# Patient Record
Sex: FEMALE | Race: WHITE | NOT HISPANIC OR LATINO | URBAN - METROPOLITAN AREA
[De-identification: names, ages, dates, MRNs, and addresses within clinical notes are randomized per-mention and may not be internally consistent; named-entity substitution may affect disease eponyms.]

---

## 2019-12-10 ENCOUNTER — OFFICE VISIT (OUTPATIENT)
Dept: PULMONOLOGY | Age: 74
End: 2019-12-10

## 2019-12-10 VITALS
HEART RATE: 90 BPM | WEIGHT: 214.6 LBS | SYSTOLIC BLOOD PRESSURE: 110 MMHG | HEIGHT: 66 IN | BODY MASS INDEX: 34.49 KG/M2 | OXYGEN SATURATION: 95 % | TEMPERATURE: 100.1 F | DIASTOLIC BLOOD PRESSURE: 54 MMHG | RESPIRATION RATE: 18 BRPM

## 2019-12-10 DIAGNOSIS — R05.9 COUGH: Primary | ICD-10-CM

## 2019-12-10 DIAGNOSIS — G47.33 OSA (OBSTRUCTIVE SLEEP APNEA): ICD-10-CM

## 2019-12-10 DIAGNOSIS — Z87.891 HISTORY OF TOBACCO USE: ICD-10-CM

## 2019-12-10 PROCEDURE — 99204 OFFICE O/P NEW MOD 45 MIN: CPT | Performed by: INTERNAL MEDICINE

## 2019-12-10 RX ORDER — ALBUTEROL SULFATE 2.5 MG/3ML
2.5 SOLUTION RESPIRATORY (INHALATION) PRN
COMMUNITY
Start: 2019-10-28

## 2019-12-10 RX ORDER — ALBUTEROL SULFATE 90 UG/1
2 AEROSOL, METERED RESPIRATORY (INHALATION) PRN
Refills: 0 | COMMUNITY
Start: 2019-10-28 | End: 2020-06-19 | Stop reason: SDUPTHER

## 2019-12-10 RX ORDER — NITROGLYCERIN 0.3 MG/1
0.3 TABLET SUBLINGUAL PRN
COMMUNITY
Start: 2015-08-18 | End: 2023-06-01 | Stop reason: ALTCHOICE

## 2019-12-10 RX ORDER — ALBUTEROL SULFATE 2.5 MG/3ML
2.5 SOLUTION RESPIRATORY (INHALATION) PRN
Refills: 0 | COMMUNITY
Start: 2019-10-28 | End: 2020-06-19 | Stop reason: SDUPTHER

## 2019-12-10 RX ORDER — BUDESONIDE AND FORMOTEROL FUMARATE DIHYDRATE 160; 4.5 UG/1; UG/1
2 AEROSOL RESPIRATORY (INHALATION) PRN
COMMUNITY
Start: 2019-11-08

## 2019-12-10 RX ORDER — LOSARTAN POTASSIUM 100 MG/1
50 TABLET ORAL DAILY
COMMUNITY
Start: 2018-09-04

## 2019-12-10 RX ORDER — ALBUTEROL SULFATE 90 UG/1
2 AEROSOL, METERED RESPIRATORY (INHALATION) PRN
COMMUNITY
Start: 2019-10-28

## 2019-12-10 RX ORDER — ROSUVASTATIN CALCIUM 40 MG/1
20 TABLET, COATED ORAL EVERY EVENING
COMMUNITY
Start: 2013-07-18

## 2019-12-10 SDOH — HEALTH STABILITY: MENTAL HEALTH: HOW OFTEN DO YOU HAVE A DRINK CONTAINING ALCOHOL?: NEVER

## 2019-12-18 ENCOUNTER — TELEPHONE (OUTPATIENT)
Dept: CT IMAGING | Age: 74
End: 2019-12-18

## 2019-12-27 ENCOUNTER — OFFICE VISIT (OUTPATIENT)
Dept: PULMONOLOGY | Age: 74
End: 2019-12-27
Attending: INTERNAL MEDICINE

## 2019-12-27 VITALS — HEIGHT: 66 IN | BODY MASS INDEX: 34.87 KG/M2 | WEIGHT: 217 LBS

## 2019-12-27 DIAGNOSIS — R05.9 COUGH: Primary | ICD-10-CM

## 2019-12-27 PROCEDURE — 94729 DIFFUSING CAPACITY: CPT | Performed by: INTERNAL MEDICINE

## 2019-12-27 PROCEDURE — 94010 BREATHING CAPACITY TEST: CPT | Performed by: INTERNAL MEDICINE

## 2019-12-27 PROCEDURE — 94727 GAS DIL/WSHOT DETER LNG VOL: CPT | Performed by: INTERNAL MEDICINE

## 2020-01-08 ENCOUNTER — TELEPHONE (OUTPATIENT)
Dept: PULMONOLOGY | Age: 75
End: 2020-01-08

## 2020-01-09 ENCOUNTER — TELEPHONE (OUTPATIENT)
Dept: PULMONOLOGY | Age: 75
End: 2020-01-09

## 2020-01-23 ENCOUNTER — HOSPITAL ENCOUNTER (OUTPATIENT)
Dept: HOSPITAL 87 - OR | Age: 75
Discharge: HOME | End: 2020-01-23
Attending: ORTHOPAEDIC SURGERY
Payer: MEDICARE

## 2020-01-23 VITALS — WEIGHT: 209 LBS | BODY MASS INDEX: 38.46 KG/M2 | HEIGHT: 62 IN

## 2020-01-23 DIAGNOSIS — E11.9: ICD-10-CM

## 2020-01-23 DIAGNOSIS — Z88.2: ICD-10-CM

## 2020-01-23 DIAGNOSIS — J44.9: ICD-10-CM

## 2020-01-23 DIAGNOSIS — Z90.710: ICD-10-CM

## 2020-01-23 DIAGNOSIS — E78.00: ICD-10-CM

## 2020-01-23 DIAGNOSIS — E66.01: ICD-10-CM

## 2020-01-23 DIAGNOSIS — K21.9: ICD-10-CM

## 2020-01-23 DIAGNOSIS — Z88.0: ICD-10-CM

## 2020-01-23 DIAGNOSIS — Z88.1: ICD-10-CM

## 2020-01-23 DIAGNOSIS — M10.9: ICD-10-CM

## 2020-01-23 DIAGNOSIS — Z88.8: ICD-10-CM

## 2020-01-23 DIAGNOSIS — F32.9: ICD-10-CM

## 2020-01-23 DIAGNOSIS — Z79.899: ICD-10-CM

## 2020-01-23 DIAGNOSIS — M17.9: ICD-10-CM

## 2020-01-23 DIAGNOSIS — Z82.49: ICD-10-CM

## 2020-01-23 DIAGNOSIS — Z98.890: ICD-10-CM

## 2020-01-23 DIAGNOSIS — I10: ICD-10-CM

## 2020-01-23 DIAGNOSIS — Z79.82: ICD-10-CM

## 2020-01-23 DIAGNOSIS — M65.4: Primary | ICD-10-CM

## 2020-01-23 DIAGNOSIS — F41.9: ICD-10-CM

## 2020-01-23 PROCEDURE — 25000 INCISION OF TENDON SHEATH: CPT

## 2020-01-23 PROCEDURE — 82962 GLUCOSE BLOOD TEST: CPT

## 2020-03-17 ENCOUNTER — APPOINTMENT (OUTPATIENT)
Dept: PULMONOLOGY | Age: 75
End: 2020-03-17

## 2020-06-19 RX ORDER — MELOXICAM 15 MG/1
15 TABLET ORAL DAILY
COMMUNITY
End: 2023-06-01 | Stop reason: ALTCHOICE

## 2020-06-19 RX ORDER — GABAPENTIN 300 MG/1
300 CAPSULE ORAL 2 TIMES DAILY
Status: ON HOLD | COMMUNITY
End: 2023-06-02 | Stop reason: SDUPTHER

## 2020-06-30 ENCOUNTER — TELEPHONIC VISIT (OUTPATIENT)
Dept: PULMONOLOGY | Age: 75
End: 2020-06-30

## 2020-06-30 ENCOUNTER — OFF PREMISE (OUTPATIENT)
Dept: CARDIOLOGY | Age: 75
End: 2020-06-30

## 2020-08-10 ENCOUNTER — APPOINTMENT (OUTPATIENT)
Dept: PULMONOLOGY | Age: 75
End: 2020-08-10

## 2021-06-03 ENCOUNTER — HOSPITAL ENCOUNTER (INPATIENT)
Dept: HOSPITAL 87 - ER | Age: 76
LOS: 5 days | Discharge: HOME | DRG: 439 | End: 2021-06-08
Attending: INTERNAL MEDICINE | Admitting: INTERNAL MEDICINE
Payer: MEDICARE

## 2021-06-03 VITALS — HEIGHT: 62 IN | WEIGHT: 210 LBS | BODY MASS INDEX: 38.64 KG/M2

## 2021-06-03 DIAGNOSIS — E11.22: ICD-10-CM

## 2021-06-03 DIAGNOSIS — I12.9: ICD-10-CM

## 2021-06-03 DIAGNOSIS — Z88.2: ICD-10-CM

## 2021-06-03 DIAGNOSIS — N32.81: ICD-10-CM

## 2021-06-03 DIAGNOSIS — E11.42: ICD-10-CM

## 2021-06-03 DIAGNOSIS — Z79.899: ICD-10-CM

## 2021-06-03 DIAGNOSIS — Z86.010: ICD-10-CM

## 2021-06-03 DIAGNOSIS — E11.51: ICD-10-CM

## 2021-06-03 DIAGNOSIS — M19.019: ICD-10-CM

## 2021-06-03 DIAGNOSIS — Z82.49: ICD-10-CM

## 2021-06-03 DIAGNOSIS — Z79.51: ICD-10-CM

## 2021-06-03 DIAGNOSIS — Z87.19: ICD-10-CM

## 2021-06-03 DIAGNOSIS — R16.0: ICD-10-CM

## 2021-06-03 DIAGNOSIS — G47.33: ICD-10-CM

## 2021-06-03 DIAGNOSIS — K76.0: ICD-10-CM

## 2021-06-03 DIAGNOSIS — M96.1: ICD-10-CM

## 2021-06-03 DIAGNOSIS — Z83.3: ICD-10-CM

## 2021-06-03 DIAGNOSIS — F41.9: ICD-10-CM

## 2021-06-03 DIAGNOSIS — K85.10: Primary | ICD-10-CM

## 2021-06-03 DIAGNOSIS — Z88.8: ICD-10-CM

## 2021-06-03 DIAGNOSIS — Z88.0: ICD-10-CM

## 2021-06-03 DIAGNOSIS — K80.00: ICD-10-CM

## 2021-06-03 DIAGNOSIS — E66.01: ICD-10-CM

## 2021-06-03 DIAGNOSIS — J44.9: ICD-10-CM

## 2021-06-03 DIAGNOSIS — N18.9: ICD-10-CM

## 2021-06-03 DIAGNOSIS — N39.0: ICD-10-CM

## 2021-06-03 DIAGNOSIS — M17.10: ICD-10-CM

## 2021-06-03 LAB
BASOPHILS NFR BLD AUTO: 0.5 % (ref 0–2)
CHLORIDE SERPL-SCNC: 105 MEQ/L (ref 98–107)
EOSINOPHIL NFR BLD AUTO: 0.3 % (ref 0–5)
ERYTHROCYTE [DISTWIDTH] IN BLOOD BY AUTOMATED COUNT: 14.7 % (ref 11.6–14.6)
ETHANOL SERPL-MCNC: < 10 MG/DL
HCT VFR BLD AUTO: 38.7 % (ref 36–48)
HGB BLD-MCNC: 12.4 G/DL (ref 12–16)
INR PPP: 1
LYMPHOCYTES NFR BLD AUTO: 16.1 % (ref 20–50)
MCH RBC QN AUTO: 27.4 PG (ref 28–32)
MCV RBC AUTO: 85.5 FL (ref 81–99)
MONOCYTES NFR BLD AUTO: 6.3 % (ref 2–8)
NEUTROPHILS NFR BLD AUTO: 76.8 % (ref 40–76)
PLATELET # BLD AUTO: 219 X1000/UL (ref 130–400)
PMV BLD AUTO: 9.2 FL (ref 7.4–10.4)
PROTHROMBIN TIME: 11.2 SEC (ref 9.6–11)
RBC # BLD AUTO: 4.53 MILL/UL (ref 4.2–5.4)

## 2021-06-03 PROCEDURE — 74176 CT ABD & PELVIS W/O CONTRAST: CPT

## 2021-06-03 PROCEDURE — 80320 DRUG SCREEN QUANTALCOHOLS: CPT

## 2021-06-03 PROCEDURE — 82150 ASSAY OF AMYLASE: CPT

## 2021-06-03 PROCEDURE — 83605 ASSAY OF LACTIC ACID: CPT

## 2021-06-03 PROCEDURE — 36415 COLL VENOUS BLD VENIPUNCTURE: CPT

## 2021-06-03 PROCEDURE — G0480 DRUG TEST DEF 1-7 CLASSES: HCPCS

## 2021-06-03 PROCEDURE — 80061 LIPID PANEL: CPT

## 2021-06-03 PROCEDURE — 80305 DRUG TEST PRSMV DIR OPT OBS: CPT

## 2021-06-03 PROCEDURE — 81003 URINALYSIS AUTO W/O SCOPE: CPT

## 2021-06-03 PROCEDURE — 80048 BASIC METABOLIC PNL TOTAL CA: CPT

## 2021-06-03 PROCEDURE — 93970 EXTREMITY STUDY: CPT

## 2021-06-03 PROCEDURE — 76700 US EXAM ABDOM COMPLETE: CPT

## 2021-06-03 PROCEDURE — 99285 EMERGENCY DEPT VISIT HI MDM: CPT

## 2021-06-03 PROCEDURE — 82962 GLUCOSE BLOOD TEST: CPT

## 2021-06-03 PROCEDURE — 80053 COMPREHEN METABOLIC PANEL: CPT

## 2021-06-03 PROCEDURE — 83036 HEMOGLOBIN GLYCOSYLATED A1C: CPT

## 2021-06-03 PROCEDURE — 85025 COMPLETE CBC W/AUTO DIFF WBC: CPT

## 2021-06-04 VITALS — SYSTOLIC BLOOD PRESSURE: 141 MMHG | DIASTOLIC BLOOD PRESSURE: 62 MMHG

## 2021-06-04 VITALS — SYSTOLIC BLOOD PRESSURE: 146 MMHG | DIASTOLIC BLOOD PRESSURE: 65 MMHG

## 2021-06-04 VITALS — SYSTOLIC BLOOD PRESSURE: 151 MMHG | DIASTOLIC BLOOD PRESSURE: 58 MMHG

## 2021-06-04 VITALS — SYSTOLIC BLOOD PRESSURE: 124 MMHG | DIASTOLIC BLOOD PRESSURE: 48 MMHG

## 2021-06-04 VITALS — SYSTOLIC BLOOD PRESSURE: 144 MMHG | DIASTOLIC BLOOD PRESSURE: 56 MMHG

## 2021-06-04 LAB
AMPHETAMINES UR QL SCN: NEGATIVE
AMYLASE SERPL-CCNC: 490 IU/L (ref 25–115)
APPEARANCE UR: (no result)
BARBITURATES UR QL SCN: NEGATIVE
BENZODIAZ UR QL SCN: NEGATIVE
BZE UR QL SCN: NEGATIVE
CANNABINOIDS UR QL SCN: NEGATIVE
COLOR UR: YELLOW
HGB UR QL STRIP: (no result)
KETONES UR STRIP-MCNC: (no result) MG/DL
LEUKOCYTE ESTERASE UR QL STRIP: (no result)
METHADONE UR QL SCN: NEGATIVE
NITRITE UR QL STRIP: NEGATIVE
OPIATES UR QL SCN: (no result)
PCP UR QL SCN: NEGATIVE
PH UR STRIP: 5.5 [PH] (ref 4.5–8)
PROT UR QL STRIP: (no result)
SP GR UR STRIP: 1.03 (ref 1–1.03)
UROBILINOGEN UR STRIP-MCNC: 0.2 E.U./DL (ref 0.2–1)

## 2021-06-04 RX ADMIN — Medication SCH STRIP: at 18:16

## 2021-06-04 RX ADMIN — FOLIC ACID SCH MLS/HR: 5 INJECTION, SOLUTION INTRAMUSCULAR; INTRAVENOUS; SUBCUTANEOUS at 10:42

## 2021-06-04 RX ADMIN — Medication SCH STRIP: at 12:20

## 2021-06-04 RX ADMIN — LEVOFLOXACIN SCH MLS/HR: 5 INJECTION, SOLUTION INTRAVENOUS at 12:48

## 2021-06-04 RX ADMIN — MORPHINE SULFATE PRN MG: 2 INJECTION, SOLUTION INTRAMUSCULAR; INTRAVENOUS at 10:40

## 2021-06-04 RX ADMIN — Medication SCH STRIP: at 20:28

## 2021-06-04 RX ADMIN — HYDRALAZINE HYDROCHLORIDE SCH MG: 50 TABLET ORAL at 20:37

## 2021-06-04 RX ADMIN — FOLIC ACID SCH MLS/HR: 5 INJECTION, SOLUTION INTRAMUSCULAR; INTRAVENOUS; SUBCUTANEOUS at 20:38

## 2021-06-04 RX ADMIN — HYDRALAZINE HYDROCHLORIDE SCH MG: 50 TABLET ORAL at 10:38

## 2021-06-04 RX ADMIN — MORPHINE SULFATE PRN MG: 2 INJECTION, SOLUTION INTRAMUSCULAR; INTRAVENOUS at 18:24

## 2021-06-05 VITALS — SYSTOLIC BLOOD PRESSURE: 145 MMHG | DIASTOLIC BLOOD PRESSURE: 60 MMHG

## 2021-06-05 VITALS — DIASTOLIC BLOOD PRESSURE: 72 MMHG | SYSTOLIC BLOOD PRESSURE: 123 MMHG

## 2021-06-05 VITALS — SYSTOLIC BLOOD PRESSURE: 165 MMHG | DIASTOLIC BLOOD PRESSURE: 59 MMHG

## 2021-06-05 VITALS — SYSTOLIC BLOOD PRESSURE: 143 MMHG | DIASTOLIC BLOOD PRESSURE: 66 MMHG

## 2021-06-05 LAB
AMYLASE SERPL-CCNC: 155 IU/L (ref 25–115)
BASOPHILS NFR BLD AUTO: 0.4 % (ref 0–2)
CHLORIDE SERPL-SCNC: 107 MEQ/L (ref 98–107)
EOSINOPHIL NFR BLD AUTO: 1.5 % (ref 0–5)
ERYTHROCYTE [DISTWIDTH] IN BLOOD BY AUTOMATED COUNT: 14.5 % (ref 11.6–14.6)
HCT VFR BLD AUTO: 32.2 % (ref 36–48)
HDLC SERPL-MCNC: 38 MG/DL (ref 40–59)
HGB BLD-MCNC: 10.4 G/DL (ref 12–16)
LDLC SERPL DIRECT ASSAY-MCNC: 62 MG/DL (ref 5–100)
LYMPHOCYTES NFR BLD AUTO: 12 % (ref 20–50)
MCH RBC QN AUTO: 27.6 PG (ref 28–32)
MCV RBC AUTO: 85.3 FL (ref 81–99)
MONOCYTES NFR BLD AUTO: 8.3 % (ref 2–8)
NEUTROPHILS NFR BLD AUTO: 77.8 % (ref 40–76)
PLATELET # BLD AUTO: 178 X1000/UL (ref 130–400)
PMV BLD AUTO: 9.1 FL (ref 7.4–10.4)
RBC # BLD AUTO: 3.78 MILL/UL (ref 4.2–5.4)

## 2021-06-05 RX ADMIN — FOLIC ACID SCH MLS/HR: 5 INJECTION, SOLUTION INTRAMUSCULAR; INTRAVENOUS; SUBCUTANEOUS at 06:24

## 2021-06-05 RX ADMIN — Medication SCH STRIP: at 17:09

## 2021-06-05 RX ADMIN — FOLIC ACID SCH MLS/HR: 5 INJECTION, SOLUTION INTRAMUSCULAR; INTRAVENOUS; SUBCUTANEOUS at 16:41

## 2021-06-05 RX ADMIN — MORPHINE SULFATE PRN MG: 2 INJECTION, SOLUTION INTRAMUSCULAR; INTRAVENOUS at 18:02

## 2021-06-05 RX ADMIN — Medication SCH STRIP: at 06:22

## 2021-06-05 RX ADMIN — ACETAMINOPHEN PRN MG: 325 TABLET, FILM COATED ORAL at 08:38

## 2021-06-05 RX ADMIN — HYDRALAZINE HYDROCHLORIDE SCH MG: 50 TABLET ORAL at 20:24

## 2021-06-05 RX ADMIN — HYDRALAZINE HYDROCHLORIDE SCH MG: 50 TABLET ORAL at 08:37

## 2021-06-05 RX ADMIN — Medication SCH STRIP: at 20:30

## 2021-06-05 RX ADMIN — LEVOFLOXACIN SCH MLS/HR: 5 INJECTION, SOLUTION INTRAVENOUS at 11:03

## 2021-06-05 RX ADMIN — Medication SCH STRIP: at 12:04

## 2021-06-05 RX ADMIN — FOLIC ACID SCH MLS/HR: 5 INJECTION, SOLUTION INTRAMUSCULAR; INTRAVENOUS; SUBCUTANEOUS at 20:25

## 2021-06-05 RX ADMIN — MORPHINE SULFATE PRN MG: 2 INJECTION, SOLUTION INTRAMUSCULAR; INTRAVENOUS at 11:09

## 2021-06-06 VITALS — DIASTOLIC BLOOD PRESSURE: 71 MMHG | SYSTOLIC BLOOD PRESSURE: 191 MMHG

## 2021-06-06 VITALS — DIASTOLIC BLOOD PRESSURE: 89 MMHG | SYSTOLIC BLOOD PRESSURE: 191 MMHG

## 2021-06-06 VITALS — SYSTOLIC BLOOD PRESSURE: 153 MMHG | DIASTOLIC BLOOD PRESSURE: 70 MMHG

## 2021-06-06 LAB
BASOPHILS NFR BLD AUTO: 0.3 % (ref 0–2)
CHLORIDE SERPL-SCNC: 109 MEQ/L (ref 98–107)
EOSINOPHIL NFR BLD AUTO: 2.1 % (ref 0–5)
ERYTHROCYTE [DISTWIDTH] IN BLOOD BY AUTOMATED COUNT: 14.1 % (ref 11.6–14.6)
HCT VFR BLD AUTO: 34.7 % (ref 36–48)
HGB BLD-MCNC: 11.5 G/DL (ref 12–16)
LYMPHOCYTES NFR BLD AUTO: 14.5 % (ref 20–50)
MCH RBC QN AUTO: 28 PG (ref 28–32)
MCV RBC AUTO: 84.3 FL (ref 81–99)
MONOCYTES NFR BLD AUTO: 6.8 % (ref 2–8)
NEUTROPHILS NFR BLD AUTO: 76.3 % (ref 40–76)
PLATELET # BLD AUTO: 254 X1000/UL (ref 130–400)
PMV BLD AUTO: 9 FL (ref 7.4–10.4)
RBC # BLD AUTO: 4.11 MILL/UL (ref 4.2–5.4)

## 2021-06-06 RX ADMIN — MORPHINE SULFATE PRN MG: 2 INJECTION, SOLUTION INTRAMUSCULAR; INTRAVENOUS at 15:30

## 2021-06-06 RX ADMIN — HYDRALAZINE HYDROCHLORIDE SCH MG: 100 TABLET, FILM COATED ORAL at 20:31

## 2021-06-06 RX ADMIN — Medication SCH STRIP: at 12:20

## 2021-06-06 RX ADMIN — LISINOPRIL SCH MG: 20 TABLET ORAL at 20:47

## 2021-06-06 RX ADMIN — Medication SCH STRIP: at 06:04

## 2021-06-06 RX ADMIN — ACETAMINOPHEN PRN MG: 325 TABLET, FILM COATED ORAL at 11:13

## 2021-06-06 RX ADMIN — LEVOFLOXACIN SCH MLS/HR: 5 INJECTION, SOLUTION INTRAVENOUS at 11:07

## 2021-06-06 RX ADMIN — HYDRALAZINE HYDROCHLORIDE SCH MG: 50 TABLET ORAL at 09:48

## 2021-06-06 RX ADMIN — FOLIC ACID SCH MLS/HR: 5 INJECTION, SOLUTION INTRAMUSCULAR; INTRAVENOUS; SUBCUTANEOUS at 15:28

## 2021-06-06 RX ADMIN — Medication SCH STRIP: at 20:31

## 2021-06-06 RX ADMIN — POLYETHYLENE GLYCOL 3350 SCH EA: 17 POWDER, FOR SOLUTION ORAL at 14:15

## 2021-06-06 RX ADMIN — Medication SCH STRIP: at 18:13

## 2021-06-06 RX ADMIN — ACETAMINOPHEN PRN MG: 325 TABLET, FILM COATED ORAL at 00:39

## 2021-06-06 RX ADMIN — SENNOSIDES AND DOCUSATE SODIUM SCH TAB: 8.6; 5 TABLET ORAL at 21:00

## 2021-06-06 RX ADMIN — DOCUSATE SODIUM SCH MG: 100 CAPSULE, LIQUID FILLED ORAL at 17:00

## 2021-06-07 VITALS — DIASTOLIC BLOOD PRESSURE: 75 MMHG | SYSTOLIC BLOOD PRESSURE: 180 MMHG

## 2021-06-07 VITALS — SYSTOLIC BLOOD PRESSURE: 161 MMHG | DIASTOLIC BLOOD PRESSURE: 73 MMHG

## 2021-06-07 VITALS — SYSTOLIC BLOOD PRESSURE: 193 MMHG | DIASTOLIC BLOOD PRESSURE: 81 MMHG

## 2021-06-07 VITALS — DIASTOLIC BLOOD PRESSURE: 67 MMHG | SYSTOLIC BLOOD PRESSURE: 173 MMHG

## 2021-06-07 VITALS — DIASTOLIC BLOOD PRESSURE: 75 MMHG | SYSTOLIC BLOOD PRESSURE: 179 MMHG

## 2021-06-07 VITALS — SYSTOLIC BLOOD PRESSURE: 182 MMHG | DIASTOLIC BLOOD PRESSURE: 94 MMHG

## 2021-06-07 VITALS — SYSTOLIC BLOOD PRESSURE: 189 MMHG | DIASTOLIC BLOOD PRESSURE: 76 MMHG

## 2021-06-07 LAB
AMYLASE SERPL-CCNC: 64 IU/L (ref 25–115)
BASOPHILS NFR BLD AUTO: 0.4 % (ref 0–2)
CHLORIDE SERPL-SCNC: 108 MEQ/L (ref 98–107)
EOSINOPHIL NFR BLD AUTO: 2.3 % (ref 0–5)
ERYTHROCYTE [DISTWIDTH] IN BLOOD BY AUTOMATED COUNT: 14.2 % (ref 11.6–14.6)
HCT VFR BLD AUTO: 33 % (ref 36–48)
HGB BLD-MCNC: 10.9 G/DL (ref 12–16)
LYMPHOCYTES NFR BLD AUTO: 13.3 % (ref 20–50)
MCH RBC QN AUTO: 27.9 PG (ref 28–32)
MCV RBC AUTO: 84.7 FL (ref 81–99)
MONOCYTES NFR BLD AUTO: 7.8 % (ref 2–8)
NEUTROPHILS NFR BLD AUTO: 76.2 % (ref 40–76)
PLATELET # BLD AUTO: 251 X1000/UL (ref 130–400)
PMV BLD AUTO: 9 FL (ref 7.4–10.4)
RBC # BLD AUTO: 3.9 MILL/UL (ref 4.2–5.4)

## 2021-06-07 RX ADMIN — HYDRALAZINE HYDROCHLORIDE SCH MG: 100 TABLET, FILM COATED ORAL at 15:11

## 2021-06-07 RX ADMIN — POLYETHYLENE GLYCOL 3350 SCH EA: 17 POWDER, FOR SOLUTION ORAL at 08:55

## 2021-06-07 RX ADMIN — SENNOSIDES AND DOCUSATE SODIUM SCH TAB: 8.6; 5 TABLET ORAL at 21:00

## 2021-06-07 RX ADMIN — HYDRALAZINE HYDROCHLORIDE SCH MG: 100 TABLET, FILM COATED ORAL at 05:28

## 2021-06-07 RX ADMIN — LEVOFLOXACIN SCH MLS/HR: 5 INJECTION, SOLUTION INTRAVENOUS at 10:16

## 2021-06-07 RX ADMIN — Medication SCH STRIP: at 17:23

## 2021-06-07 RX ADMIN — LISINOPRIL SCH MG: 20 TABLET ORAL at 21:45

## 2021-06-07 RX ADMIN — DOCUSATE SODIUM SCH MG: 100 CAPSULE, LIQUID FILLED ORAL at 08:55

## 2021-06-07 RX ADMIN — HYDRALAZINE HYDROCHLORIDE SCH MG: 100 TABLET, FILM COATED ORAL at 21:44

## 2021-06-07 RX ADMIN — MORPHINE SULFATE PRN MG: 2 INJECTION, SOLUTION INTRAMUSCULAR; INTRAVENOUS at 04:44

## 2021-06-07 RX ADMIN — ACETAMINOPHEN PRN MG: 325 TABLET, FILM COATED ORAL at 12:01

## 2021-06-07 RX ADMIN — LISINOPRIL SCH MG: 20 TABLET ORAL at 08:55

## 2021-06-07 RX ADMIN — FOLIC ACID SCH MLS/HR: 5 INJECTION, SOLUTION INTRAMUSCULAR; INTRAVENOUS; SUBCUTANEOUS at 08:56

## 2021-06-07 RX ADMIN — FOLIC ACID SCH MLS/HR: 5 INJECTION, SOLUTION INTRAMUSCULAR; INTRAVENOUS; SUBCUTANEOUS at 19:08

## 2021-06-07 RX ADMIN — Medication SCH STRIP: at 06:47

## 2021-06-07 RX ADMIN — Medication SCH STRIP: at 11:52

## 2021-06-07 RX ADMIN — Medication SCH STRIP: at 21:45

## 2021-06-07 RX ADMIN — DOCUSATE SODIUM SCH MG: 100 CAPSULE, LIQUID FILLED ORAL at 17:23

## 2021-06-07 RX ADMIN — FOLIC ACID SCH MLS/HR: 5 INJECTION, SOLUTION INTRAMUSCULAR; INTRAVENOUS; SUBCUTANEOUS at 04:42

## 2021-06-08 VITALS — DIASTOLIC BLOOD PRESSURE: 76 MMHG | SYSTOLIC BLOOD PRESSURE: 137 MMHG

## 2021-06-08 VITALS — DIASTOLIC BLOOD PRESSURE: 84 MMHG | SYSTOLIC BLOOD PRESSURE: 176 MMHG

## 2021-06-08 VITALS — DIASTOLIC BLOOD PRESSURE: 86 MMHG | SYSTOLIC BLOOD PRESSURE: 185 MMHG

## 2021-06-08 VITALS — SYSTOLIC BLOOD PRESSURE: 173 MMHG | DIASTOLIC BLOOD PRESSURE: 82 MMHG

## 2021-06-08 VITALS — SYSTOLIC BLOOD PRESSURE: 163 MMHG | DIASTOLIC BLOOD PRESSURE: 73 MMHG

## 2021-06-08 VITALS — DIASTOLIC BLOOD PRESSURE: 65 MMHG | SYSTOLIC BLOOD PRESSURE: 152 MMHG

## 2021-06-08 LAB — AMYLASE SERPL-CCNC: 54 IU/L (ref 25–115)

## 2021-06-08 RX ADMIN — Medication SCH STRIP: at 06:47

## 2021-06-08 RX ADMIN — Medication SCH STRIP: at 12:57

## 2021-06-08 RX ADMIN — FOLIC ACID SCH MLS/HR: 5 INJECTION, SOLUTION INTRAMUSCULAR; INTRAVENOUS; SUBCUTANEOUS at 04:41

## 2021-06-08 RX ADMIN — LEVOFLOXACIN SCH MLS/HR: 5 INJECTION, SOLUTION INTRAVENOUS at 10:26

## 2021-06-08 RX ADMIN — HYDRALAZINE HYDROCHLORIDE SCH MG: 100 TABLET, FILM COATED ORAL at 13:12

## 2021-06-08 RX ADMIN — HYDRALAZINE HYDROCHLORIDE SCH MG: 100 TABLET, FILM COATED ORAL at 05:08

## 2021-06-08 RX ADMIN — DOCUSATE SODIUM SCH MG: 100 CAPSULE, LIQUID FILLED ORAL at 09:15

## 2021-06-08 RX ADMIN — LISINOPRIL SCH MG: 20 TABLET ORAL at 09:16

## 2021-06-08 RX ADMIN — POLYETHYLENE GLYCOL 3350 SCH EA: 17 POWDER, FOR SOLUTION ORAL at 09:00

## 2022-06-27 ENCOUNTER — APPOINTMENT (OUTPATIENT)
Dept: URBAN - NONMETROPOLITAN AREA CLINIC 59 | Age: 77
Setting detail: DERMATOLOGY
End: 2022-06-28

## 2022-06-27 DIAGNOSIS — L72.0 EPIDERMAL CYST: ICD-10-CM

## 2022-06-27 DIAGNOSIS — L82.0 INFLAMED SEBORRHEIC KERATOSIS: ICD-10-CM

## 2022-06-27 DIAGNOSIS — Z85.828 PERSONAL HISTORY OF OTHER MALIGNANT NEOPLASM OF SKIN: ICD-10-CM

## 2022-06-27 DIAGNOSIS — L30.4 ERYTHEMA INTERTRIGO: ICD-10-CM

## 2022-06-27 PROCEDURE — OTHER COUNSELING: OTHER

## 2022-06-27 PROCEDURE — OTHER MEDICATION COUNSELING: OTHER

## 2022-06-27 PROCEDURE — OTHER LIQUID NITROGEN: OTHER

## 2022-06-27 PROCEDURE — OTHER PRESCRIPTION: OTHER

## 2022-06-27 PROCEDURE — 99213 OFFICE O/P EST LOW 20 MIN: CPT | Mod: 25

## 2022-06-27 PROCEDURE — OTHER PRESCRIPTION MEDICATION MANAGEMENT: OTHER

## 2022-06-27 PROCEDURE — 17110 DESTRUCT B9 LESION 1-14: CPT

## 2022-06-27 RX ORDER — CLOTRIMAZOLE AND BETAMETHASONE DIPROPIONATE 10; .5 MG/G; MG/G
CREAM TOPICAL BID
Qty: 45 | Refills: 1 | Status: ERX | COMMUNITY
Start: 2022-06-27

## 2022-06-27 ASSESSMENT — LOCATION SIMPLE DESCRIPTION DERM
LOCATION SIMPLE: RIGHT BREAST
LOCATION SIMPLE: RIGHT INFERIOR EYELID
LOCATION SIMPLE: LEFT UPPER BACK
LOCATION SIMPLE: GROIN
LOCATION SIMPLE: LEFT BREAST
LOCATION SIMPLE: RIGHT LOWER BACK

## 2022-06-27 ASSESSMENT — LOCATION DETAILED DESCRIPTION DERM
LOCATION DETAILED: LEFT SUPERIOR UPPER BACK
LOCATION DETAILED: RIGHT INFRAMAMMARY CREASE (INNER QUADRANT)
LOCATION DETAILED: RIGHT INFERIOR LATERAL LOWER BACK
LOCATION DETAILED: RIGHT SUPRAPUBIC SKIN
LOCATION DETAILED: LEFT INFRAMAMMARY CREASE (INNER QUADRANT)
LOCATION DETAILED: RIGHT MEDIAL INFERIOR EYELID
LOCATION DETAILED: LEFT SUPRAPUBIC SKIN
LOCATION DETAILED: LEFT MID-UPPER BACK

## 2022-06-27 ASSESSMENT — LOCATION ZONE DERM
LOCATION ZONE: TRUNK
LOCATION ZONE: EYELID

## 2022-06-27 NOTE — PROCEDURE: LIQUID NITROGEN
Medical Necessity Information: It is in your best interest to select a reason for this procedure from the list below. All of these items fulfill various CMS LCD requirements except the new and changing color options.
Detail Level: Detailed
Spray Paint Technique: No
Duration Of Freeze Thaw-Cycle (Seconds): 5-10
Show Spray Paint Technique Variable?: Yes
Post-Care Instructions: I reviewed with the patient in detail post-care instructions. Patient is to wear sunprotection, and avoid picking at any of the treated lesions. Pt may apply Vaseline to crusted or scabbing areas.
Consent: The patient's consent was obtained including but not limited to risks of crusting, scabbing, blistering, scarring, darker or lighter pigmentary change, recurrence, incomplete removal and infection.
Application Tool (Optional): Liquid Nitrogen Sprayer
Medical Necessity Clause: This procedure was medically necessary because the lesions that were treated were:
Spray Paint Text: The liquid nitrogen was applied to the skin utilizing a spray paint frosting technique.
Number Of Freeze-Thaw Cycles: 3 freeze-thaw cycles
Pared With?: 15 blade

## 2022-06-27 NOTE — PROCEDURE: PRESCRIPTION MEDICATION MANAGEMENT
Render In Strict Bullet Format?: No
Initiate Treatment: Lotrisone cream AAA BID 2 weeks off 2 weeks on -repeat for flares
Detail Level: Zone

## 2022-06-27 NOTE — PROCEDURE: MEDICATION COUNSELING
Xelcharissez Pregnancy And Lactation Text: This medication is Pregnancy Category D and is not considered safe during pregnancy.  The risk during breast feeding is also uncertain.

## 2022-06-27 NOTE — PROCEDURE: COUNSELING
Detail Level: Detailed
Detail Level: Simple
Patient Specific Counseling (Will Not Stick From Patient To Patient): Hx BCC right cheek 2017

## 2023-06-01 RX ORDER — ACETAMINOPHEN 500 MG
1000 TABLET ORAL PRN
Status: ON HOLD | COMMUNITY
End: 2023-06-02 | Stop reason: SDUPTHER

## 2023-06-01 ASSESSMENT — ACTIVITIES OF DAILY LIVING (ADL)
SENSORY_SUPPORT_DEVICES: EYEGLASSES
ADL_BEFORE_ADMISSION: INDEPENDENT
ADL_SCORE: 12
MOBILITY_ASSIST_DEVICES: FRONT-WHEELED WALKER

## 2023-06-02 ENCOUNTER — ANESTHESIA EVENT (OUTPATIENT)
Dept: SURGERY | Age: 78
End: 2023-06-02

## 2023-06-02 ENCOUNTER — HOSPITAL ENCOUNTER (OUTPATIENT)
Age: 78
Discharge: HOME | End: 2023-06-02
Attending: ORTHOPAEDIC SURGERY | Admitting: ORTHOPAEDIC SURGERY

## 2023-06-02 ENCOUNTER — APPOINTMENT (OUTPATIENT)
Dept: GENERAL RADIOLOGY | Age: 78
End: 2023-06-02
Attending: ORTHOPAEDIC SURGERY

## 2023-06-02 ENCOUNTER — ANESTHESIA (OUTPATIENT)
Dept: SURGERY | Age: 78
End: 2023-06-02

## 2023-06-02 DIAGNOSIS — I10 HYPERTENSION, UNSPECIFIED TYPE: Primary | ICD-10-CM

## 2023-06-02 LAB
ATRIAL RATE (BPM): 82
P AXIS (DEGREES): 46
PR-INTERVAL (MSEC): 206
QRS-INTERVAL (MSEC): 98
QT-INTERVAL (MSEC): 372
QTC: 434
R AXIS (DEGREES): 7
REPORT TEXT: NORMAL
T AXIS (DEGREES): 17
VENTRICULAR RATE EKG/MIN (BPM): 82

## 2023-06-02 PROCEDURE — 10002801 HB RX 250 W/O HCPCS: Performed by: ORTHOPAEDIC SURGERY

## 2023-06-02 PROCEDURE — 13000116 HB ORTHO COMPLEX CASE S/U + 1ST 15 MIN: Performed by: ORTHOPAEDIC SURGERY

## 2023-06-02 PROCEDURE — 10002803 HB RX 637: Performed by: SPECIALIST

## 2023-06-02 PROCEDURE — 10006023 HB SUPPLY 272: Performed by: ORTHOPAEDIC SURGERY

## 2023-06-02 PROCEDURE — 10002807 HB RX 258: Performed by: SPECIALIST

## 2023-06-02 PROCEDURE — 10004452 HB PACU ADDL 30 MINUTES: Performed by: ORTHOPAEDIC SURGERY

## 2023-06-02 PROCEDURE — C1713 ANCHOR/SCREW BN/BN,TIS/BN: HCPCS | Performed by: ORTHOPAEDIC SURGERY

## 2023-06-02 PROCEDURE — 10002803 HB RX 637: Performed by: ORTHOPAEDIC SURGERY

## 2023-06-02 PROCEDURE — 10002800 HB RX 250 W HCPCS

## 2023-06-02 PROCEDURE — 10004180 HB COUNTER-TRANSPORT

## 2023-06-02 PROCEDURE — 73552 X-RAY EXAM OF FEMUR 2/>: CPT

## 2023-06-02 PROCEDURE — 10006027 HB SUPPLY 278: Performed by: ORTHOPAEDIC SURGERY

## 2023-06-02 PROCEDURE — 94640 AIRWAY INHALATION TREATMENT: CPT

## 2023-06-02 PROCEDURE — 10002801 HB RX 250 W/O HCPCS: Performed by: SPECIALIST

## 2023-06-02 PROCEDURE — 10002801 HB RX 250 W/O HCPCS

## 2023-06-02 PROCEDURE — 13000003 HB ANESTHESIA  GENERAL EA ADD MINUTE: Performed by: ORTHOPAEDIC SURGERY

## 2023-06-02 PROCEDURE — 13000001 HB PHASE II RECOVERY EA 30 MINUTES: Performed by: ORTHOPAEDIC SURGERY

## 2023-06-02 PROCEDURE — 10002800 HB RX 250 W HCPCS: Performed by: SPECIALIST

## 2023-06-02 PROCEDURE — 13000117 HB ORTHO COMPLEX CASE EA ADD MINUTE: Performed by: ORTHOPAEDIC SURGERY

## 2023-06-02 PROCEDURE — 76000 FLUOROSCOPY <1 HR PHYS/QHP: CPT

## 2023-06-02 PROCEDURE — 93005 ELECTROCARDIOGRAM TRACING: CPT

## 2023-06-02 PROCEDURE — C1769 GUIDE WIRE: HCPCS | Performed by: ORTHOPAEDIC SURGERY

## 2023-06-02 PROCEDURE — 10004451 HB PACU RECOVERY 1ST 30 MINUTES: Performed by: ORTHOPAEDIC SURGERY

## 2023-06-02 PROCEDURE — 13000002 HB ANESTHESIA  GENERAL  S/U + 1ST 15 MIN: Performed by: ORTHOPAEDIC SURGERY

## 2023-06-02 DEVICE — SCREW BN 2.7MM 34MM T8 SELF TAP LOCK VAR ANG STRDR SS NS: Type: IMPLANTABLE DEVICE | Site: KNEE | Status: FUNCTIONAL

## 2023-06-02 DEVICE — IMPLANTABLE DEVICE: Type: IMPLANTABLE DEVICE | Site: KNEE | Status: FUNCTIONAL

## 2023-06-02 DEVICE — SCREW BN 2.7MM 16MM T8 SELF TAP LOCK VAR ANG STRDR SS NS: Type: IMPLANTABLE DEVICE | Site: KNEE | Status: FUNCTIONAL

## 2023-06-02 DEVICE — SCREW BN 2.7MM 22MM T8 SELF TAP VAR ANG STRDR LOCK SS NS: Type: IMPLANTABLE DEVICE | Site: KNEE | Status: FUNCTIONAL

## 2023-06-02 DEVICE — SCREW BN 2.7MM 20MM T8 SELF TAP LOCK VAR ANG STRDR SS NS: Type: IMPLANTABLE DEVICE | Site: KNEE | Status: FUNCTIONAL

## 2023-06-02 DEVICE — SCREW BN 2.7MM 18MM T8 SELF TAP LOCK VAR ANG STRDR SS NS: Type: IMPLANTABLE DEVICE | Site: KNEE | Status: FUNCTIONAL

## 2023-06-02 RX ORDER — MIDAZOLAM HYDROCHLORIDE 1 MG/ML
INJECTION, SOLUTION INTRAMUSCULAR; INTRAVENOUS PRN
Status: DISCONTINUED | OUTPATIENT
Start: 2023-06-02 | End: 2023-06-02

## 2023-06-02 RX ORDER — ASPIRIN 81 MG/1
324 TABLET ORAL DAILY
Qty: 120 TABLET | Refills: 0 | Status: SHIPPED | COMMUNITY
Start: 2023-06-02 | End: 2023-07-02

## 2023-06-02 RX ORDER — ONDANSETRON 2 MG/ML
INJECTION INTRAMUSCULAR; INTRAVENOUS PRN
Status: DISCONTINUED | OUTPATIENT
Start: 2023-06-02 | End: 2023-06-02

## 2023-06-02 RX ORDER — ALBUTEROL SULFATE 2.5 MG/3ML
SOLUTION RESPIRATORY (INHALATION)
Status: COMPLETED
Start: 2023-06-02 | End: 2023-06-02

## 2023-06-02 RX ORDER — CYCLOBENZAPRINE HCL 10 MG
5 TABLET ORAL
Status: DISCONTINUED | OUTPATIENT
Start: 2023-06-02 | End: 2023-06-02 | Stop reason: HOSPADM

## 2023-06-02 RX ORDER — ACETAMINOPHEN 500 MG
1000 TABLET ORAL 3 TIMES DAILY
Status: SHIPPED | COMMUNITY
Start: 2023-06-02

## 2023-06-02 RX ORDER — GABAPENTIN 300 MG/1
300 CAPSULE ORAL 3 TIMES DAILY
Qty: 90 CAPSULE | Refills: 0 | Status: SHIPPED
Start: 2023-06-02 | End: 2023-07-02

## 2023-06-02 RX ORDER — HYDROCODONE BITARTRATE AND ACETAMINOPHEN 5; 325 MG/1; MG/1
1 TABLET ORAL
Status: COMPLETED | OUTPATIENT
Start: 2023-06-02 | End: 2023-06-02

## 2023-06-02 RX ORDER — CELECOXIB 200 MG/1
200 CAPSULE ORAL ONCE
Status: CANCELLED | OUTPATIENT
Start: 2023-06-02

## 2023-06-02 RX ORDER — DIPHENHYDRAMINE HYDROCHLORIDE 50 MG/ML
25 INJECTION INTRAMUSCULAR; INTRAVENOUS
Status: DISCONTINUED | OUTPATIENT
Start: 2023-06-02 | End: 2023-06-02 | Stop reason: HOSPADM

## 2023-06-02 RX ORDER — ONDANSETRON 2 MG/ML
4 INJECTION INTRAMUSCULAR; INTRAVENOUS
Status: DISCONTINUED | OUTPATIENT
Start: 2023-06-02 | End: 2023-06-02 | Stop reason: HOSPADM

## 2023-06-02 RX ORDER — ALBUTEROL SULFATE 2.5 MG/3ML
2.5 SOLUTION RESPIRATORY (INHALATION)
Status: DISCONTINUED | OUTPATIENT
Start: 2023-06-02 | End: 2023-06-02 | Stop reason: HOSPADM

## 2023-06-02 RX ORDER — CEFADROXIL 500 MG/1
500 CAPSULE ORAL 2 TIMES DAILY
Qty: 4 CAPSULE | Refills: 0 | Status: SHIPPED | OUTPATIENT
Start: 2023-06-02 | End: 2023-06-04

## 2023-06-02 RX ORDER — ONDANSETRON 2 MG/ML
4 INJECTION INTRAMUSCULAR; INTRAVENOUS EVERY 12 HOURS PRN
Status: DISCONTINUED | OUTPATIENT
Start: 2023-06-02 | End: 2023-06-02 | Stop reason: HOSPADM

## 2023-06-02 RX ORDER — METOCLOPRAMIDE HYDROCHLORIDE 5 MG/ML
10 INJECTION INTRAMUSCULAR; INTRAVENOUS ONCE
Status: DISCONTINUED | OUTPATIENT
Start: 2023-06-02 | End: 2023-06-02 | Stop reason: HOSPADM

## 2023-06-02 RX ORDER — HYDRALAZINE HYDROCHLORIDE 20 MG/ML
5 INJECTION INTRAMUSCULAR; INTRAVENOUS
Status: DISCONTINUED | OUTPATIENT
Start: 2023-06-02 | End: 2023-06-02 | Stop reason: HOSPADM

## 2023-06-02 RX ORDER — SODIUM CHLORIDE, SODIUM LACTATE, POTASSIUM CHLORIDE, CALCIUM CHLORIDE 600; 310; 30; 20 MG/100ML; MG/100ML; MG/100ML; MG/100ML
INJECTION, SOLUTION INTRAVENOUS CONTINUOUS PRN
Status: DISCONTINUED | OUTPATIENT
Start: 2023-06-02 | End: 2023-06-02

## 2023-06-02 RX ORDER — GABAPENTIN 300 MG/1
300 CAPSULE ORAL ONCE
Status: CANCELLED | OUTPATIENT
Start: 2023-06-02

## 2023-06-02 RX ORDER — DEXAMETHASONE SODIUM PHOSPHATE 4 MG/ML
INJECTION, SOLUTION INTRA-ARTICULAR; INTRALESIONAL; INTRAMUSCULAR; INTRAVENOUS; SOFT TISSUE PRN
Status: DISCONTINUED | OUTPATIENT
Start: 2023-06-02 | End: 2023-06-02

## 2023-06-02 RX ORDER — PROPOFOL 10 MG/ML
INJECTION, EMULSION INTRAVENOUS PRN
Status: DISCONTINUED | OUTPATIENT
Start: 2023-06-02 | End: 2023-06-02

## 2023-06-02 RX ORDER — LIDOCAINE HYDROCHLORIDE 20 MG/ML
INJECTION, SOLUTION INFILTRATION; PERINEURAL PRN
Status: DISCONTINUED | OUTPATIENT
Start: 2023-06-02 | End: 2023-06-02

## 2023-06-02 RX ORDER — ALBUTEROL SULFATE 2.5 MG/3ML
2.5 SOLUTION RESPIRATORY (INHALATION) ONCE
Status: COMPLETED | OUTPATIENT
Start: 2023-06-02 | End: 2023-06-02

## 2023-06-02 RX ORDER — ACETAMINOPHEN 500 MG
1000 TABLET ORAL ONCE
Status: CANCELLED | OUTPATIENT
Start: 2023-06-02

## 2023-06-02 RX ORDER — BUPIVACAINE HYDROCHLORIDE AND EPINEPHRINE 2.5; 5 MG/ML; UG/ML
INJECTION, SOLUTION EPIDURAL; INFILTRATION; INTRACAUDAL; PERINEURAL PRN
Status: DISCONTINUED | OUTPATIENT
Start: 2023-06-02 | End: 2023-06-02 | Stop reason: HOSPADM

## 2023-06-02 RX ORDER — SODIUM CHLORIDE, SODIUM LACTATE, POTASSIUM CHLORIDE, CALCIUM CHLORIDE 600; 310; 30; 20 MG/100ML; MG/100ML; MG/100ML; MG/100ML
INJECTION, SOLUTION INTRAVENOUS CONTINUOUS
Status: DISCONTINUED | OUTPATIENT
Start: 2023-06-02 | End: 2023-06-02 | Stop reason: HOSPADM

## 2023-06-02 RX ORDER — FAMOTIDINE 20 MG/1
20 TABLET, FILM COATED ORAL ONCE
Status: COMPLETED | OUTPATIENT
Start: 2023-06-02 | End: 2023-06-02

## 2023-06-02 RX ORDER — HYDROCODONE BITARTRATE AND ACETAMINOPHEN 5; 325 MG/1; MG/1
1-2 TABLET ORAL ONCE
Status: DISCONTINUED | OUTPATIENT
Start: 2023-06-02 | End: 2023-06-02 | Stop reason: HOSPADM

## 2023-06-02 RX ORDER — ACETAMINOPHEN 325 MG/1
650 TABLET ORAL EVERY 4 HOURS PRN
Status: DISCONTINUED | OUTPATIENT
Start: 2023-06-02 | End: 2023-06-02 | Stop reason: HOSPADM

## 2023-06-02 RX ADMIN — PROPOFOL 150 MG: 10 INJECTION, EMULSION INTRAVENOUS at 10:14

## 2023-06-02 RX ADMIN — LIDOCAINE HYDROCHLORIDE 5 ML: 20 INJECTION, SOLUTION INFILTRATION; PERINEURAL at 10:14

## 2023-06-02 RX ADMIN — FAMOTIDINE 20 MG: 20 TABLET, FILM COATED ORAL at 09:33

## 2023-06-02 RX ADMIN — FENTANYL CITRATE 25 MCG: 50 INJECTION, SOLUTION INTRAMUSCULAR; INTRAVENOUS at 13:19

## 2023-06-02 RX ADMIN — ALBUTEROL SULFATE 2.5 MG: 2.5 SOLUTION RESPIRATORY (INHALATION) at 14:25

## 2023-06-02 RX ADMIN — SODIUM CHLORIDE, POTASSIUM CHLORIDE, SODIUM LACTATE AND CALCIUM CHLORIDE: 600; 310; 30; 20 INJECTION, SOLUTION INTRAVENOUS at 10:00

## 2023-06-02 RX ADMIN — FENTANYL CITRATE 25 MCG: 50 INJECTION, SOLUTION INTRAMUSCULAR; INTRAVENOUS at 13:39

## 2023-06-02 RX ADMIN — FENTANYL CITRATE 50 MCG: 50 INJECTION, SOLUTION INTRAMUSCULAR; INTRAVENOUS at 10:11

## 2023-06-02 RX ADMIN — CEFAZOLIN SODIUM 2000 MG: 300 INJECTION, POWDER, LYOPHILIZED, FOR SOLUTION INTRAVENOUS at 10:21

## 2023-06-02 RX ADMIN — SODIUM CHLORIDE, POTASSIUM CHLORIDE, SODIUM LACTATE AND CALCIUM CHLORIDE: 600; 310; 30; 20 INJECTION, SOLUTION INTRAVENOUS at 09:35

## 2023-06-02 RX ADMIN — HYDROMORPHONE HYDROCHLORIDE 0.2 MG: 1 INJECTION, SOLUTION INTRAMUSCULAR; INTRAVENOUS; SUBCUTANEOUS at 13:29

## 2023-06-02 RX ADMIN — MIDAZOLAM HYDROCHLORIDE 2 MG: 1 INJECTION, SOLUTION INTRAMUSCULAR; INTRAVENOUS at 10:09

## 2023-06-02 RX ADMIN — FENTANYL CITRATE 50 MCG: 50 INJECTION, SOLUTION INTRAMUSCULAR; INTRAVENOUS at 11:08

## 2023-06-02 RX ADMIN — HYDROCODONE BITARTRATE AND ACETAMINOPHEN 1 TABLET: 5; 325 TABLET ORAL at 15:07

## 2023-06-02 RX ADMIN — FENTANYL CITRATE 50 MCG: 50 INJECTION, SOLUTION INTRAMUSCULAR; INTRAVENOUS at 10:29

## 2023-06-02 RX ADMIN — DEXAMETHASONE SODIUM PHOSPHATE 4 MG: 4 INJECTION, SOLUTION INTRAMUSCULAR; INTRAVENOUS at 10:44

## 2023-06-02 RX ADMIN — ALBUTEROL SULFATE 2.5 MG: 2.5 SOLUTION RESPIRATORY (INHALATION) at 10:01

## 2023-06-02 RX ADMIN — LABETALOL HYDROCHLORIDE 10 MG: 5 INJECTION, SOLUTION INTRAVENOUS at 11:08

## 2023-06-02 RX ADMIN — FENTANYL CITRATE 50 MCG: 50 INJECTION, SOLUTION INTRAMUSCULAR; INTRAVENOUS at 10:43

## 2023-06-02 RX ADMIN — ONDANSETRON 4 MG: 2 INJECTION INTRAMUSCULAR; INTRAVENOUS at 11:36

## 2023-06-02 ASSESSMENT — PAIN SCALES - GENERAL
PAINLEVEL_OUTOF10: 5
PAINLEVEL_OUTOF10: 4
PAINLEVEL_OUTOF10: 0
PAINLEVEL_OUTOF10: 0
PAINLEVEL_OUTOF10: 8
PAINLEVEL_OUTOF10: 7
PAINLEVEL_OUTOF10: 6
PAINLEVEL_OUTOF10: 6
PAINLEVEL_OUTOF10: 4

## 2023-06-02 ASSESSMENT — LIFESTYLE VARIABLES: SMOKING_STATUS: FORMER

## 2023-06-02 ASSESSMENT — COPD QUESTIONNAIRES: COPD: 1

## 2023-06-05 VITALS
TEMPERATURE: 97.5 F | HEART RATE: 90 BPM | DIASTOLIC BLOOD PRESSURE: 67 MMHG | BODY MASS INDEX: 37.72 KG/M2 | SYSTOLIC BLOOD PRESSURE: 150 MMHG | WEIGHT: 226.41 LBS | RESPIRATION RATE: 20 BRPM | OXYGEN SATURATION: 96 % | HEIGHT: 65 IN

## (undated) DEVICE — STAPLER SKIN 3.9X6.9MM WIDE RECT 35 CNT ROTATE HEAD RCHT

## (undated) DEVICE — DRAPE EXPAND CLPSBL C ARM FLRSCP EQUIPMENT C-ARMOR STRL

## (undated) DEVICE — DRESSING PETRO 8X1IN NADH OCL BCTRST LOWPRFL XEROFORM 3% BI

## (undated) DEVICE — GLOVE SURG 7.5 PROTEXIS ESTM LF CRM PF SMTH BEAD CUFF INTLK

## (undated) DEVICE — Device

## (undated) DEVICE — SOLUTION PREP 70% ISO ALC 4OZ LF

## (undated) DEVICE — BANDAGE CMPR VELCLOSE 5YDX4IN ELASTIC VELCRO POLY YARN LYCRA

## (undated) DEVICE — SOLUTION IRR 1000ML 0.9% NACL PLASTIC POUR BTL ISTNC N-PYRG

## (undated) DEVICE — DRAPE ADH 51X47IN SURG STRDRP STRL LF DISP CLR

## (undated) DEVICE — BANDAGE CMPR ESMK 9FTX6IN SMTH FNSH ELASTIC STRL LF

## (undated) DEVICE — GUIDEWIRE LCP 1.6MM 150MM ORTHO THRD CMPR 10MM NS

## (undated) DEVICE — GLOVE SURG 6.5 PROTEXIS LF CRM PF BEAD CUFF STRL PLISPRN

## (undated) DEVICE — GLOVE SURG 8 PROTEXIS LF CRM PF BEAD CUFF STRL PLISPRN 12IN

## (undated) DEVICE — BANDAGE GAUZE BLK2 4.1YDX4.5IN 6 PLY OPEN WEAVE TIGHT FNSH

## (undated) DEVICE — CUFF TRNQT 30X4IN ATS CYL 2 PORT BLDR REPRO STRL LF DISP

## (undated) DEVICE — BIT DRILL 2MM 140MM QC D MARK NS

## (undated) DEVICE — SCREW BN 2.7MM 38MM T8 SELF TAP LOCK VAR ANG STRDR SS NS
Type: IMPLANTABLE DEVICE | Site: LEG UPPER | Status: NON-FUNCTIONAL
Removed: 2023-06-02

## (undated) DEVICE — COVER RGD STRL LF LIGHT HNDL PLASTIC GRN

## (undated) DEVICE — SYRINGE 30ML CONC TIP GRAD N-PYRG DEHP-FR STRL MED LF DISP

## (undated) DEVICE — PAD ABD 9X5IN CELLULOSE ABS NONWOVEN HDRPHB BACK SEAL EDGE

## (undated) DEVICE — GLOVE SURG 7.5 PROTEXIS LF CRM PF SMTH BEAD CUFF STRL

## (undated) DEVICE — GLOVE SURG 6.5 PROTEXIS ESTM LF CRM PF SMTH BEAD CUFF INTLK

## (undated) DEVICE — DRAPE .75 SHT FNFLD 76X52IN SURG CNVRT STRL LF DISP TIBURON

## (undated) DEVICE — GLOVE SURG 7 PROTEXIS LF BLUE PF SMTH BEAD CUFF INTLK STRL

## (undated) DEVICE — SPONGE GAUZE 4X4IN CTN 16 PLY WOVEN FOLD EDGE STRL LF

## (undated) DEVICE — BAG EQUIPMENT 40X40IN SNAP KOVER BAND STRL LF

## (undated) DEVICE — GOWN SURG LG L3 NONREINFORCE SET IN SLV STRL LF DISP BLUE

## (undated) DEVICE — SCREW BN 2.7MM 24MM T8 SELF TAP LOCK VAR ANG STRDR SS NS
Type: IMPLANTABLE DEVICE | Site: LEG UPPER | Status: NON-FUNCTIONAL
Removed: 2023-06-02

## (undated) DEVICE — WRAP CMPR 5YDX4IN COBAN POR SLFADH ELASTIC LTWT HAND TEAR LF

## (undated) DEVICE — SUTURE VICRYL 1 OS-6 27IN BRAID COAT ABS UNDYED J535H

## (undated) DEVICE — DRAPE PK PLATE PRTC FTSWTCH XRY TUBE EQUIPMENT TIDI STRL

## (undated) DEVICE — GLOVE SURG 7.5 PROTEXIS LF BLUE PF SMTH BEAD CUFF INTLK STRL

## (undated) DEVICE — SYRINGE 20ML GRAD STRL MED DISP LL

## (undated) DEVICE — GLOVE SURG 7 PROTEXIS LF CRM PF BEAD CUFF STRL PLISPRN 12IN

## (undated) DEVICE — POUCH INST 11X7IN 2 ADH STRIP 2 CMPRT STRL STRDRP PLASTIC

## (undated) DEVICE — BANDAGE CMPR VELCLOSE 5YDX6IN HOOK SLFCLSR KNIT ELASTIC MED

## (undated) DEVICE — NEEDLE HPO 22GA 1.5IN REG WALL REG BVL LL SHLD MECH DEHP-FR

## (undated) DEVICE — SUTURE ETHIBOND EXCEL 2 5-37 30IN BRAID 4 STRN NABSB GRN MX69G

## (undated) DEVICE — SUTURE VICRYL 2-0 CT2 27IN BRAID COAT ABS UNDYED J269H